# Patient Record
Sex: FEMALE | Race: WHITE | URBAN - METROPOLITAN AREA
[De-identification: names, ages, dates, MRNs, and addresses within clinical notes are randomized per-mention and may not be internally consistent; named-entity substitution may affect disease eponyms.]

---

## 2018-11-08 NOTE — PATIENT DISCUSSION
1.  1.  Dry Eye OD:  Continue current management with Artificial Tears. 2.  Nuclear Sclerotic Cataract OS: Explained how cataracts can effect vision. Recommend clinical observation. The patient was advised to contact us if any change or worsening of vision. 3. Pseudophakia OD - IOL stable. Monitor. 4. Transplant Doing well after transplant. Continue topical steroids. Rejection and vaccinations reviewed. 5. PCO  OD (Posterior Capsule Opacification)   PCO is visually significant and impairment of vision does not meet the patient’s functional needs or interferes with activities of daily living. Risks benefits and alternatives to the Nd:YAG Laser reviewed including elevated IOP immediately postop and retinal tear/detachment. Patient to notify their ophthalmologist promptly if they have a significant change in symptoms such as flashes of light (photopsia) an increase in floaters loss of visual field or decrease in visual acuity after the procedure. Patient will be scheduled in [place] [id number] for Nd:YAG Laser. OD

## 2018-11-08 NOTE — PATIENT DISCUSSION
Transplant Doing well after transplant. Continue topical steroids. Rejection and vaccinations reviewed.

## 2018-11-08 NOTE — PATIENT DISCUSSION
PCO  OD (Posterior Capsule Opacification)   PCO is visually significant and impairment of vision does not meet the patient’s functional needs or interferes with activities of daily living. Risks benefits and alternatives to the Nd:YAG Laser reviewed including elevated IOP immediately postop and retinal tear/detachment. Patient to notify their ophthalmologist promptly if they have a significant change in symptoms such as flashes of light (photopsia) an increase in floaters loss of visual field or decrease in visual acuity after the procedure. Patient will be scheduled in [facility] for Nd:YAG Laser.

## 2018-11-16 NOTE — PATIENT DISCUSSION
s/p YAG laser capsulotomy for Posterior Capsule Opacification (PCO) in the Right Eye (OD). Please contact us if you have a significant change in symptoms such as flashes of light (photopsia) increased floaters decrease/loss of visual field or visual acuity. Aberrant eyelash right upper eyelid  - removed

## 2019-03-14 NOTE — PATIENT DISCUSSION
1.  Pseudophakia OD - IOLs stable. Monitor. s/p YAG caps OD2. Transplant Doing well after transplant. Continue topical steroids. Rejection and vaccinations reviewed. 3.  Cataract OS

## 2020-01-03 NOTE — PATIENT DISCUSSION
1.  PVD OS: Patient was cautioned to call our office immediately if they experience a substantial change in their symptoms such as an increase in floaters persistent flashes loss of visual field (may appear as a shadow or a curtain) or decrease in visual acuity as these may indicate a retinal tear or detachment. If this is a new problem patient will need to return for re-examination  as determined by the physician. 2. Combined Types of Cataract OS: Explained how cataracts can effect vision. Recommend clinical observation. The patient was advised to contact us if any change or worsening of vision. 3. Pseudophakia OD - IOL stable. Monitor for changes in vision. 4. Transplant Doing well after transplant. Continue topical steroids. Rejection and vaccinations reviewed. DFE OS 1 month

## 2022-04-25 ENCOUNTER — NEW PATIENT (OUTPATIENT)
Dept: URBAN - METROPOLITAN AREA CLINIC 20 | Facility: CLINIC | Age: 77
End: 2022-04-25

## 2022-04-25 DIAGNOSIS — H52.13: ICD-10-CM

## 2022-04-25 DIAGNOSIS — H18.613: ICD-10-CM

## 2022-04-25 DIAGNOSIS — H25.13: ICD-10-CM

## 2022-04-25 PROCEDURE — 92015 DETERMINE REFRACTIVE STATE: CPT

## 2022-04-25 PROCEDURE — 92004 COMPRE OPH EXAM NEW PT 1/>: CPT

## 2022-04-25 ASSESSMENT — VISUAL ACUITY
OD_CC: 20/50
OS_CC: 20/30-1
OU_CC: 20/20

## 2022-04-25 ASSESSMENT — TONOMETRY
OS_IOP_MMHG: 10
OD_IOP_MMHG: 10

## 2022-04-25 NOTE — PATIENT DISCUSSION
Park Chang confirmed diagnosis. Patient educated on corneal cross linking and declines option. Patient educated not a candidate for LASIK as a tool for enhancement. Patient educated that their best vision will require RGP contact lenses which patient declines.

## 2022-06-27 NOTE — PATIENT DISCUSSION
Patient understands condition, prognosis and need for follow up care. Quality 226: Preventive Care And Screening: Tobacco Use: Screening And Cessation Intervention: Patient screened for tobacco use and is an ex/non-smoker Quality 110: Preventive Care And Screening: Influenza Immunization: Influenza Immunization not Administered because Patient Refused. Detail Level: Detailed details…

## 2023-06-26 ENCOUNTER — ESTABLISHED PATIENT (OUTPATIENT)
Dept: URBAN - METROPOLITAN AREA CLINIC 20 | Facility: CLINIC | Age: 78
End: 2023-06-26

## 2023-06-26 DIAGNOSIS — H52.13: ICD-10-CM

## 2023-06-26 DIAGNOSIS — H18.613: ICD-10-CM

## 2023-06-26 DIAGNOSIS — H25.13: ICD-10-CM

## 2023-06-26 PROCEDURE — 92015 DETERMINE REFRACTIVE STATE: CPT

## 2023-06-26 PROCEDURE — 92014 COMPRE OPH EXAM EST PT 1/>: CPT

## 2023-06-26 ASSESSMENT — VISUAL ACUITY
OS_CC: 20/30-1
OD_CC: 20/50-1
OU_CC: J1
OD_PH: 20/40

## 2023-06-26 ASSESSMENT — KERATOMETRY
OS_AXISANGLE2_DEGREES: 64
OD_K1POWER_DIOPTERS: 47.25
OS_AXISANGLE_DEGREES: 154
OD_AXISANGLE_DEGREES: 41
OS_K1POWER_DIOPTERS: 45.25
OS_K2POWER_DIOPTERS: 46.75
OD_AXISANGLE2_DEGREES: 131
OD_K2POWER_DIOPTERS: 50.75

## 2023-06-26 ASSESSMENT — TONOMETRY
OD_IOP_MMHG: 10
OS_IOP_MMHG: 11